# Patient Record
Sex: MALE | Race: WHITE | NOT HISPANIC OR LATINO | Employment: FULL TIME | URBAN - METROPOLITAN AREA
[De-identification: names, ages, dates, MRNs, and addresses within clinical notes are randomized per-mention and may not be internally consistent; named-entity substitution may affect disease eponyms.]

---

## 2023-02-01 ENCOUNTER — OFFICE VISIT (OUTPATIENT)
Dept: FAMILY MEDICINE | Facility: CLINIC | Age: 48
End: 2023-02-01
Payer: COMMERCIAL

## 2023-02-01 VITALS
DIASTOLIC BLOOD PRESSURE: 66 MMHG | HEART RATE: 77 BPM | TEMPERATURE: 98.3 F | BODY MASS INDEX: 20.76 KG/M2 | SYSTOLIC BLOOD PRESSURE: 101 MMHG | WEIGHT: 145 LBS | OXYGEN SATURATION: 96 % | HEIGHT: 70 IN

## 2023-02-01 DIAGNOSIS — H60.501 ACUTE OTITIS EXTERNA OF RIGHT EAR, UNSPECIFIED TYPE: ICD-10-CM

## 2023-02-01 DIAGNOSIS — H61.23 BILATERAL IMPACTED CERUMEN: ICD-10-CM

## 2023-02-01 DIAGNOSIS — H66.92 ACUTE LEFT OTITIS MEDIA: Primary | ICD-10-CM

## 2023-02-01 RX ORDER — DOXYCYCLINE HYCLATE 100 MG
100 TABLET ORAL 2 TIMES DAILY
Qty: 14 TABLET | Refills: 0 | Status: SHIPPED | OUTPATIENT
Start: 2023-02-01

## 2023-02-01 RX ORDER — CIPROFLOXACIN AND DEXAMETHASONE 3; 1 MG/ML; MG/ML
4 SUSPENSION/ DROPS AURICULAR (OTIC) 2 TIMES DAILY
Qty: 7.5 ML | Refills: 0 | Status: SHIPPED | OUTPATIENT
Start: 2023-02-01

## 2023-02-01 ASSESSMENT — ENCOUNTER SYMPTOMS
COUGH: 0
CHILLS: 0
SORE THROAT: 0
PALPITATIONS: 0
ADENOPATHY: 0
ALLERGIC/IMMUNOLOGIC NEGATIVE: 1
SHORTNESS OF BREATH: 0

## 2023-02-01 NOTE — NURSING NOTE
"ROOM:1  SHAHBAZ ELIAS    Preferred Name: Jm     How did you hear about us?  Other - Call center    47 year old  Chief Complaint   Patient presents with     Otalgia     Left ear   Couple days ago        Blood pressure 101/66, pulse 77, temperature 98.3  F (36.8  C), temperature source Oral, height 1.778 m (5' 10\"), weight 65.8 kg (145 lb), SpO2 96 %. Body mass index is 20.81 kg/m .  BP completed using cuff size:        There is no problem list on file for this patient.      Wt Readings from Last 2 Encounters:   02/01/23 65.8 kg (145 lb)     BP Readings from Last 3 Encounters:   02/01/23 101/66       Allergies   Allergen Reactions     Ampicillin        No current outpatient medications on file.     No current facility-administered medications for this visit.       Social History     Tobacco Use     Smoking status: Former     Types: Cigarettes     Smokeless tobacco: Never       Honoring Choices - Health Care Directive Guide offered to patient at time of visit.    There are no preventive care reminders to display for this patient.      There is no immunization history on file for this patient.    No results found for: PAP    No lab results found.    PHQ-2 ( 1999 Pfizer) 2/1/2023   Q1: Little interest or pleasure in doing things 1   Q2: Feeling down, depressed or hopeless 0   PHQ-2 Score 1       No flowsheet data found.    No flowsheet data found.    No flowsheet data found.    Dominguez Mejia    February 1, 2023 1:56 PM    "

## 2023-02-01 NOTE — PROGRESS NOTES
"Today's Date: Feb 1, 2023     Patient Jm Livingston 1975 presents to the clinic today to address   Chief Complaint   Patient presents with     Otalgia     Left ear   Couple days ago              SUBJECTIVE     History of Present Illness:    47-year-old male presents to clinic to discuss Left ear pain. Onset was approximately 7 days ago. He denies trauma to his ear or frequent swimming. He endorses frequent qtip use. He denies fevers/chills, congestion, cough, SOB, or CP. No other acute concerns/symptoms at time of exam.            Review of Systems   Constitutional: Negative for chills.   HENT: Positive for ear pain (Left). Negative for congestion and sore throat.    Respiratory: Negative for cough and shortness of breath.    Cardiovascular: Negative for chest pain and palpitations.   Allergic/Immunologic: Negative.    Hematological: Negative for adenopathy.   Constitutional, HEENT, cardiovascular, pulmonary, gi and gu systems are negative, except as otherwise noted.      Allergies   Allergen Reactions     Ampicillin         No current outpatient medications    History reviewed. No pertinent past medical history.     History reviewed. No pertinent family history.     Social History     Tobacco Use     Smoking status: Former     Types: Cigarettes     Smokeless tobacco: Never        History   Sexual Activity     Sexual activity: Not on file        No flowsheet data found.       There is no immunization history on file for this patient.              OBJECTIVE     /66   Pulse 77   Temp 98.3  F (36.8  C) (Oral)   Ht 1.778 m (5' 10\")   Wt 65.8 kg (145 lb)   SpO2 96%   BMI 20.81 kg/m       Labs:  No results found for: WBC, HGB, HCT, PLT, CHOL, TRIG, HDL, LDLDIRECT, ALT, AST, NA, CREATININE, BUN, CO2, TSH, PSA, INR, GLUF, HGBA1C, MICROALBUR     Physical Exam  Constitutional:       General: He is not in acute distress.     Appearance: He is not ill-appearing.   HENT:      Right Ear: No tenderness. There is " impacted cerumen. Tympanic membrane is not erythematous or bulging.      Left Ear: No tenderness. There is impacted cerumen. Tympanic membrane is bulging. Tympanic membrane is not erythematous.      Ears:      Comments: Post-lavage:    R ear- R EAC with  tenderness upon insertion of otoscope. EAC erythematous and edematous which obstructed visualization of R TM. Visualized portion of TM without erythema/bulging.    L Ear- L EAC without erythema/edema. L TM with bulging.     Nose: Nose normal.      Mouth/Throat:      Mouth: Mucous membranes are moist.      Pharynx: No oropharyngeal exudate or posterior oropharyngeal erythema.   Eyes:      Extraocular Movements: Extraocular movements intact.      Pupils: Pupils are equal, round, and reactive to light.   Cardiovascular:      Rate and Rhythm: Normal rate and regular rhythm.      Heart sounds: Normal heart sounds. No murmur heard.  Pulmonary:      Effort: Pulmonary effort is normal. No respiratory distress.      Breath sounds: Normal breath sounds. No wheezing, rhonchi or rales.   Musculoskeletal:      Cervical back: Neck supple.   Lymphadenopathy:      Cervical: Cervical adenopathy present.      Right cervical: Superficial cervical adenopathy present.      Left cervical: Superficial cervical adenopathy present.   Skin:     General: Skin is warm and dry.   Neurological:      General: No focal deficit present.      Mental Status: He is alert.   Psychiatric:         Thought Content: Thought content normal.         Judgment: Judgment normal.               ASSESSMENT/PLAN       1. Acute left otitis media  Patient unsure of reaction to ampicillin. Will start on doxycycline considering L TM bulging.Cervical lymphadenopathy likely secondary to AOM- advised patient to monitor neck LNs. Return to clinic in 3-4 weeks if his neck LNs feel swollen or if he has any other concerns.  - doxycycline hyclate (VIBRA-TABS) 100 MG tablet; Take 1 tablet (100 mg) by mouth 2 times daily   Dispense: 14 tablet; Refill: 0    2. Bilateral impacted cerumen  Instructed patient to discontinue use of qtips.  - REMOVE IMPACTED CERUMEN    3. Acute otitis externa of right ear, unspecified type  Start ciprodex drops to R ear.  - ciprofloxacin-dexamethasone (CIPRODEX) 0.3-0.1 % otic suspension; Place 4 drops into the right ear 2 times daily X 7 days  Dispense: 7.5 mL; Refill: 0      Follow-Up:  - Follow up in as needed, or if symptoms worsen or fail to improve.     Options for treatment and follow-up care were reviewed with the patient. Patient engaged in the decision making process and verbalized understanding of the options discussed and agreed with the final plan.  AVS printed and given to patient.    HERNANDEZ Woods [facility]  Nurse Practitioners [address] [address]  [phone]

## 2023-02-01 NOTE — NURSING NOTE
bilateral ear lavage done.  Large amount of cerumen irrigated using luke warm water and hydrogen peroxide without incident. Patient tolerated procedure well.       Meri PURCELL CMA 2:24 PM 2/1/2023

## 2024-09-25 ENCOUNTER — TRANSCRIBE ORDERS (OUTPATIENT)
Dept: OTHER | Age: 49
End: 2024-09-25

## 2024-09-25 DIAGNOSIS — M54.50 LOW BACK PAIN, UNSPECIFIED: Primary | ICD-10-CM
